# Patient Record
Sex: MALE | Race: ASIAN
[De-identification: names, ages, dates, MRNs, and addresses within clinical notes are randomized per-mention and may not be internally consistent; named-entity substitution may affect disease eponyms.]

---

## 2017-01-06 ENCOUNTER — HOSPITAL ENCOUNTER (OUTPATIENT)
Dept: HOSPITAL 10 - SDS | Age: 52
Discharge: HOME | End: 2017-01-06
Attending: SURGERY
Payer: COMMERCIAL

## 2017-01-06 VITALS — RESPIRATION RATE: 17 BRPM | DIASTOLIC BLOOD PRESSURE: 70 MMHG | SYSTOLIC BLOOD PRESSURE: 138 MMHG

## 2017-01-06 VITALS — HEART RATE: 52 BPM | SYSTOLIC BLOOD PRESSURE: 119 MMHG | DIASTOLIC BLOOD PRESSURE: 63 MMHG | RESPIRATION RATE: 14 BRPM

## 2017-01-06 VITALS
WEIGHT: 222.01 LBS | HEIGHT: 70 IN | BODY MASS INDEX: 31.78 KG/M2 | WEIGHT: 222.01 LBS | HEIGHT: 70 IN | BODY MASS INDEX: 31.78 KG/M2

## 2017-01-06 VITALS — DIASTOLIC BLOOD PRESSURE: 65 MMHG | SYSTOLIC BLOOD PRESSURE: 126 MMHG | RESPIRATION RATE: 14 BRPM | HEART RATE: 52 BPM

## 2017-01-06 VITALS — SYSTOLIC BLOOD PRESSURE: 132 MMHG | HEART RATE: 50 BPM | RESPIRATION RATE: 14 BRPM | DIASTOLIC BLOOD PRESSURE: 66 MMHG

## 2017-01-06 VITALS — HEART RATE: 52 BPM | RESPIRATION RATE: 14 BRPM | DIASTOLIC BLOOD PRESSURE: 60 MMHG | SYSTOLIC BLOOD PRESSURE: 121 MMHG

## 2017-01-06 VITALS — RESPIRATION RATE: 18 BRPM | HEART RATE: 50 BPM | DIASTOLIC BLOOD PRESSURE: 75 MMHG | SYSTOLIC BLOOD PRESSURE: 128 MMHG

## 2017-01-06 VITALS — DIASTOLIC BLOOD PRESSURE: 68 MMHG | RESPIRATION RATE: 10 BRPM | HEART RATE: 52 BPM | SYSTOLIC BLOOD PRESSURE: 131 MMHG

## 2017-01-06 VITALS — DIASTOLIC BLOOD PRESSURE: 60 MMHG | SYSTOLIC BLOOD PRESSURE: 120 MMHG | RESPIRATION RATE: 18 BRPM | HEART RATE: 51 BPM

## 2017-01-06 VITALS — DIASTOLIC BLOOD PRESSURE: 62 MMHG | SYSTOLIC BLOOD PRESSURE: 127 MMHG | RESPIRATION RATE: 14 BRPM

## 2017-01-06 VITALS — RESPIRATION RATE: 16 BRPM | DIASTOLIC BLOOD PRESSURE: 69 MMHG | SYSTOLIC BLOOD PRESSURE: 132 MMHG | HEART RATE: 52 BPM

## 2017-01-06 DIAGNOSIS — Z87.891: ICD-10-CM

## 2017-01-06 DIAGNOSIS — L91.0: Primary | ICD-10-CM

## 2017-01-06 LAB
ANION GAP SERPL CALC-SCNC: 16 MMOL/L (ref 8–16)
APTT BLD: 33.5 SEC (ref 25–35)
BASOPHILS # BLD AUTO: 0 10^3/UL (ref 0–0.1)
BASOPHILS NFR BLD: 0.5 % (ref 0–2)
BUN SERPL-MCNC: 18 MG/DL (ref 7–20)
CALCIUM SERPL-MCNC: 8.7 MG/DL (ref 8.4–10.2)
CHLORIDE SERPL-SCNC: 105 MMOL/L (ref 97–110)
CO2 SERPL-SCNC: 25 MMOL/L (ref 21–31)
CONDITION: 1
CREAT SERPL-MCNC: 0.87 MG/DL (ref 0.61–1.24)
EOSINOPHIL # BLD: 0.3 10^3/UL (ref 0–0.5)
EOSINOPHIL NFR BLD: 4.3 % (ref 0–7)
ERYTHROCYTE [DISTWIDTH] IN BLOOD BY AUTOMATED COUNT: 12.9 % (ref 11.5–14.5)
GLUCOSE SERPL-MCNC: 91 MG/DL (ref 70–220)
HCT VFR BLD CALC: 45.6 % (ref 42–52)
HGB BLD-MCNC: 15.6 G/DL (ref 14–18)
INR PPP: 0.91
LYMPHOCYTES # BLD AUTO: 2.1 10^3/UL (ref 0.8–2.9)
LYMPHOCYTES NFR BLD AUTO: 34 % (ref 15–51)
MCH RBC QN AUTO: 30.2 PG (ref 29–33)
MCHC RBC AUTO-ENTMCNC: 34.2 G/DL (ref 32–37)
MCV RBC AUTO: 88.4 FL (ref 82–101)
MONOCYTES # BLD: 0.5 10^3/UL (ref 0.3–0.9)
MONOCYTES NFR BLD: 8.7 % (ref 0–11)
NEUTROPHILS # BLD: 3.2 10^3/UL (ref 1.6–7.5)
NEUTROPHILS NFR BLD AUTO: 52.5 % (ref 39–77)
NRBC # BLD MANUAL: 0 10^3/UL (ref 0–0)
NRBC BLD QL: 0 /100WBC (ref 0–0)
PLATELET # BLD: 267 10^3/UL (ref 140–440)
PMV BLD AUTO: 8 FL (ref 7.4–10.4)
POTASSIUM SERPL-SCNC: 4.3 MMOL/L (ref 3.5–5.1)
PROTHROMBIN TIME: 12.2 SEC (ref 12.2–14.2)
PT RATIO: 1
RBC # BLD AUTO: 5.16 10^6/UL (ref 4.7–6.1)
SODIUM SERPL-SCNC: 142 MMOL/L (ref 135–144)
WBC # BLD AUTO: 6.2 10^3/UL (ref 4.8–10.8)
WBC # BLD: 6.2 10^3/UL (ref 4.8–10.8)

## 2017-01-06 PROCEDURE — 85730 THROMBOPLASTIN TIME PARTIAL: CPT

## 2017-01-06 PROCEDURE — 88304 TISSUE EXAM BY PATHOLOGIST: CPT

## 2017-01-06 PROCEDURE — 80048 BASIC METABOLIC PNL TOTAL CA: CPT

## 2017-01-06 PROCEDURE — 85610 PROTHROMBIN TIME: CPT

## 2017-01-06 PROCEDURE — 14020 TIS TRNFR S/A/L 10 SQ CM/<: CPT

## 2017-01-06 PROCEDURE — 85025 COMPLETE CBC W/AUTO DIFF WBC: CPT

## 2017-01-06 NOTE — OPR
DATE OF OPERATION:  01/06/2017

 

 

INDICATION:  This is a 51-year-old male with a left arm mass.  He requests surgical excision.  The r
isks, alternatives, benefits, and personnel were discussed with the patient. The patient expressed u
nderstanding and consented to the operation.

 

PREOPERATIVE DIAGNOSIS:  Left arm mass.

 

POSTOPERATIVE DIAGNOSIS:  Left arm mass.

 

OPERATION PERFORMED:

1.  Excision of left arm mass with a 5-cm size incision and a 5 x 1-cm size mass.

2.  Localized adjacent tissue transfer with the use of skin flaps.

 

SURGEON: Rylie Garcia MD

 

SPECIMEN:  Left arm mass.

 

COMPLICATIONS:  None.

 

ANESTHESIA:  MAC.

 

PROCEDURE:  The patient was taken to the OR and prepped and draped in the usual sterile fashion.  A 
surgical timeout was performed.  IV antibiotics were given.  An elliptical incision was made over th
e left arm mass after local anesthesia infiltration.  Dissection cautery was carried down to the lance
p tissue layers and excised.  Due to the large tissue defect, localized adjacent tissue transfer was
 used with the use of skin flaps.  Performed multilayer closure with interrupted 2-0 Vicryl and skin
 staples.  Dry dressings were applied.

 

 

Dictated By: RYLIE LIPSCOMB/JAX

DD:    01/06/2017 10:51:34

DT:    01/06/2017 12:05:52

Conf#: [id number]

DID#:  [id number]

## 2017-02-28 ENCOUNTER — HOSPITAL ENCOUNTER (EMERGENCY)
Dept: HOSPITAL 10 - E/R | Age: 52
LOS: 1 days | Discharge: HOME | End: 2017-03-01
Payer: COMMERCIAL

## 2017-02-28 VITALS
HEIGHT: 70 IN | BODY MASS INDEX: 31.62 KG/M2 | BODY MASS INDEX: 31.62 KG/M2 | WEIGHT: 220.9 LBS | WEIGHT: 220.9 LBS | HEIGHT: 70 IN

## 2017-02-28 DIAGNOSIS — R40.2252: ICD-10-CM

## 2017-02-28 DIAGNOSIS — R51: ICD-10-CM

## 2017-02-28 DIAGNOSIS — R40.2362: ICD-10-CM

## 2017-02-28 DIAGNOSIS — R40.2142: ICD-10-CM
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
## 2017-02-28 DIAGNOSIS — S09.90XA: Primary | ICD-10-CM

## 2017-02-28 PROCEDURE — 70450 CT HEAD/BRAIN W/O DYE: CPT

## 2017-02-28 NOTE — RADRPT
PROCEDURE:   CT head without Contrast

 

CLINICAL INDICATION:   Headache, status post fall 2 days ago

 

TECHNIQUE:   Transaxial images were made through the head on a multi-slice scanner without intraveno
us contrast.  Coronal and sagittal images were subsequently reformatted.

 

One or more of the following dose reduction techniques were used:

- Automated exposure control.

- Adjustment of the mA and/or kV according to patient size.

- Use of iterative reconstruction technique.

 

Radiation dose: CTDIvol = 39.34 mGy; DLP = 634.23 mGy-cm.

 

COMPARISON:   None

 

FINDINGS:

The calvarium appears intact.  There is an air-fluid level within the left maxillary sinus and the e
thmoid air cells are largely opacified bilaterally.  There is mild mucoperiosteal thickening at the 
inferior frontal sinuses.  The mastoid air cells are well-aerated.

The ventricles are normal in size and there is no midline shift.

No intracranial bleed, mass, or extra-axial fluid collection is identified.

There is good gray-white matter differentiation.

 

IMPRESSION: 

1.  No evidence of a skull fracture or acute intracranial injury.

2.  Air-fluid level seen in the left maxillary antrum with partial opacification of the ethmoid air 
cells bilaterally and with mucoperiosteal thickening involving the inferior frontal sinuses bilatera
lly suspicious for inflammatory change.

_____________________________________________ 

Physician Steve           Date    Time 

Electronically viewed and signed by Physician Steve on 02/28/2017 23:32 

 

D:  02/28/2017 23:32  T:  02/28/2017 23:32

/

## 2017-03-01 VITALS — SYSTOLIC BLOOD PRESSURE: 131 MMHG | HEART RATE: 88 BPM | DIASTOLIC BLOOD PRESSURE: 77 MMHG | RESPIRATION RATE: 16 BRPM
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
## 2017-04-19 ENCOUNTER — HOSPITAL ENCOUNTER (OUTPATIENT)
Dept: HOSPITAL 10 - SDS | Age: 52
Discharge: HOME | End: 2017-04-19
Attending: ORTHOPAEDIC SURGERY
Payer: COMMERCIAL

## 2017-04-19 VITALS — RESPIRATION RATE: 23 BRPM | DIASTOLIC BLOOD PRESSURE: 55 MMHG | SYSTOLIC BLOOD PRESSURE: 118 MMHG | HEART RATE: 60 BPM

## 2017-04-19 VITALS — RESPIRATION RATE: 16 BRPM | DIASTOLIC BLOOD PRESSURE: 71 MMHG | HEART RATE: 58 BPM | SYSTOLIC BLOOD PRESSURE: 144 MMHG

## 2017-04-19 VITALS — SYSTOLIC BLOOD PRESSURE: 118 MMHG | HEART RATE: 60 BPM | DIASTOLIC BLOOD PRESSURE: 45 MMHG | RESPIRATION RATE: 17 BRPM

## 2017-04-19 VITALS — DIASTOLIC BLOOD PRESSURE: 76 MMHG | RESPIRATION RATE: 18 BRPM | SYSTOLIC BLOOD PRESSURE: 140 MMHG | HEART RATE: 62 BPM

## 2017-04-19 VITALS — SYSTOLIC BLOOD PRESSURE: 131 MMHG | RESPIRATION RATE: 18 BRPM | DIASTOLIC BLOOD PRESSURE: 80 MMHG | HEART RATE: 60 BPM

## 2017-04-19 VITALS — HEART RATE: 60 BPM | SYSTOLIC BLOOD PRESSURE: 125 MMHG | DIASTOLIC BLOOD PRESSURE: 80 MMHG | RESPIRATION RATE: 17 BRPM

## 2017-04-19 VITALS — SYSTOLIC BLOOD PRESSURE: 137 MMHG | DIASTOLIC BLOOD PRESSURE: 78 MMHG | RESPIRATION RATE: 27 BRPM | HEART RATE: 58 BPM

## 2017-04-19 VITALS
WEIGHT: 219.14 LBS | HEIGHT: 70 IN | HEIGHT: 70 IN | BODY MASS INDEX: 31.37 KG/M2 | BODY MASS INDEX: 31.37 KG/M2 | WEIGHT: 219.14 LBS

## 2017-04-19 VITALS — RESPIRATION RATE: 14 BRPM | HEART RATE: 56 BPM | SYSTOLIC BLOOD PRESSURE: 165 MMHG | DIASTOLIC BLOOD PRESSURE: 86 MMHG

## 2017-04-19 VITALS — SYSTOLIC BLOOD PRESSURE: 127 MMHG | HEART RATE: 72 BPM | DIASTOLIC BLOOD PRESSURE: 69 MMHG | RESPIRATION RATE: 16 BRPM

## 2017-04-19 VITALS — SYSTOLIC BLOOD PRESSURE: 137 MMHG | RESPIRATION RATE: 18 BRPM | HEART RATE: 58 BPM | DIASTOLIC BLOOD PRESSURE: 76 MMHG

## 2017-04-19 DIAGNOSIS — Z89.022: ICD-10-CM

## 2017-04-19 DIAGNOSIS — L60.0: ICD-10-CM

## 2017-04-19 DIAGNOSIS — L72.0: ICD-10-CM

## 2017-04-19 DIAGNOSIS — T87.89: Primary | ICD-10-CM

## 2017-04-19 PROCEDURE — 88304 TISSUE EXAM BY PATHOLOGIST: CPT

## 2017-04-19 PROCEDURE — 26236 PARTIAL REMOVAL FINGER BONE: CPT

## 2017-04-19 NOTE — OPR
DATE OF OPERATION:  04/19/2017

 

 

ANESTHESIOLOGIST:  Francis Adams MD

 

SURGEON:  Evangelist Stapleton MD

 

ASSISTANT:  Staff.

 

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 
done elsewhere.

 

CURRENT PROBLEM:  Inclusion cyst tender corner distal ulnar dorsal corner of 
the amputation site with what was thought to be inclusion of some nail material.

 

OPERATION PERFORMED:  Revision of amputation, excision of nail fragment.

 

SURGICAL PAUSE:  I examined the patient in the preop holding area.  We examined 
his amputation stump.  We aga in the planned surgical incision.  I confirmed 
the operative procedure and plan with the patient awake as we discussed this 
procedure and how he wanted me to do it.  He wanted me to remove a little extra 
bone.    

 

INFORMED CONSENT:  At the time we scheduled the operative procedure back in the 
office, we talked about the risks and hazards of surgery mentioning OP mortality
, wound infection, nerve injury, good result, bad result, and potential 
complications.  The patient signed the note confirming that informed consent 
conversation.  

 

ANESTHESIA TECHNIQUE:  Local standby by the anesthesiologist, local anesthetic 
by the surgeon.

 

DESCRIPTION OF PROCEDURE:  The patient was awake throughout.  Sterile prep and 
drape of the left upper extremity was performed.  A Penrose drain was placed 
around the base of the ring finger.  A regional block was performed.  The prior 
surgical site was reincised.  We took an ellipse of skin around the edge of the 
inclusion cyst and the inclusion cyst.  We debrided the site and removed about 1
/8th of an inch of the distal end of the middle phalanx.  We washed the wound.  
We looked for all forms of foreign material and debrided it and then closed the 
wound loosely with interrupted Vicryl Rapide suture and a _two finger  
dressing.  The operative procedure was 15 minutes.  The patient was awake 
throughout.

 

DISCHARGE MEDICATIONS:

1.  Hydrocodone.

2.  Acetaminophen.

3.  Keflex.

 

FOLLOWUP:  Will be in our office in a week.

 

 

Dictated By: EVANGELIST KEBEDE/JAX

DD:    04/19/2017 16:58:33

DT:    04/19/2017 17:30:16

Conf#: [id number]

DID#:  [id number]

 

MTDD

## 2017-04-19 NOTE — OPR
Date/Time of Note


Date/Time of Note


DATE: 4/19/17 


TIME: 14:23





Operative Report


Procedure Date:  Apr 19, 2017


Preoperative Diagnosis


nail defomrity


Postoperative Diagnosis


same


Operation Performed


biopsy


Surgeon:  EVANGELIST BORJAS MD


Anesthesia:  general, other (lo)


Estimated Blood Loss:  none


Complications:  None


Pt Condition Post Procedure:  stable











EVANGELIST BORJAS MD Apr 19, 2017 14:24

## 2017-04-19 NOTE — HPN
Date/Time of Note


Date/Time of Note


DATE: 4/19/17 


TIME: 14:23





Interval H&P Admission Note


Pt. seen H&P reviewed:  No system changes











EVANGELIST BORJAS MD Apr 19, 2017 14:23

## 2017-11-14 ENCOUNTER — HOSPITAL ENCOUNTER (EMERGENCY)
Dept: HOSPITAL 10 - E/R | Age: 52
LOS: 1 days | Discharge: HOME | End: 2017-11-15
Payer: COMMERCIAL

## 2017-11-14 VITALS
WEIGHT: 233.69 LBS | BODY MASS INDEX: 33.46 KG/M2 | BODY MASS INDEX: 33.46 KG/M2 | WEIGHT: 233.69 LBS | HEIGHT: 70 IN | HEIGHT: 70 IN

## 2017-11-14 DIAGNOSIS — R06.02: ICD-10-CM

## 2017-11-14 DIAGNOSIS — R07.89: Primary | ICD-10-CM

## 2017-11-14 LAB
ALBUMIN SERPL-MCNC: 4.2 G/DL (ref 3.3–4.9)
ALBUMIN/GLOB SERPL: 1.44 {RATIO}
ALP SERPL-CCNC: 82 IU/L (ref 42–121)
ALT SERPL-CCNC: 53 IU/L (ref 13–69)
ANION GAP SERPL CALC-SCNC: 14 MMOL/L (ref 8–16)
AST SERPL-CCNC: 31 IU/L (ref 15–46)
BASOPHILS # BLD AUTO: 0 10^3/UL (ref 0–0.1)
BASOPHILS NFR BLD: 0.4 % (ref 0–2)
BILIRUB DIRECT SERPL-MCNC: 0 MG/DL (ref 0–0.2)
BILIRUB SERPL-MCNC: 0.4 MG/DL (ref 0.2–1.3)
BNP SERPL-MCNC: 44 PG/ML (ref 0–125)
BUN SERPL-MCNC: 17 MG/DL (ref 7–20)
CALCIUM SERPL-MCNC: 8.6 MG/DL (ref 8.4–10.2)
CHLORIDE SERPL-SCNC: 106 MMOL/L (ref 97–110)
CO2 SERPL-SCNC: 25 MMOL/L (ref 21–31)
CREAT SERPL-MCNC: 1.11 MG/DL (ref 0.61–1.24)
EOSINOPHIL # BLD: 0.3 10^3/UL (ref 0–0.5)
EOSINOPHIL NFR BLD: 3.8 % (ref 0–7)
ERYTHROCYTE [DISTWIDTH] IN BLOOD BY AUTOMATED COUNT: 12 % (ref 11.5–14.5)
GLOBULIN SER-MCNC: 2.9 G/DL (ref 1.3–3.2)
GLUCOSE SERPL-MCNC: 91 MG/DL (ref 70–220)
HCT VFR BLD CALC: 43.6 % (ref 42–52)
HGB BLD-MCNC: 15 G/DL (ref 14–18)
LYMPHOCYTES # BLD AUTO: 2.6 10^3/UL (ref 0.8–2.9)
LYMPHOCYTES NFR BLD AUTO: 38 % (ref 15–51)
MCH RBC QN AUTO: 30.2 PG (ref 29–33)
MCHC RBC AUTO-ENTMCNC: 34.4 G/DL (ref 32–37)
MCV RBC AUTO: 87.9 FL (ref 82–101)
MONOCYTES # BLD: 0.6 10^3/UL (ref 0.3–0.9)
MONOCYTES NFR BLD: 8.5 % (ref 0–11)
NEUTROPHILS # BLD: 3.4 10^3/UL (ref 1.6–7.5)
NEUTROPHILS NFR BLD AUTO: 49 % (ref 39–77)
NRBC # BLD MANUAL: 0 10^3/UL (ref 0–0)
NRBC BLD AUTO-RTO: 0 /100WBC (ref 0–0)
PLATELET # BLD: 283 10^3/UL (ref 140–415)
PMV BLD AUTO: 9.5 FL (ref 7.4–10.4)
POTASSIUM SERPL-SCNC: 4 MMOL/L (ref 3.5–5.1)
PROT SERPL-MCNC: 7.1 G/DL (ref 6.1–8.1)
RBC # BLD AUTO: 4.96 10^6/UL (ref 4.7–6.1)
SODIUM SERPL-SCNC: 141 MMOL/L (ref 135–144)
TROPONIN I SERPL-MCNC: < 0.012 NG/ML (ref 0–0.12)
WBC # BLD AUTO: 6.8 10^3/UL (ref 4.8–10.8)

## 2017-11-14 PROCEDURE — 80053 COMPREHEN METABOLIC PANEL: CPT

## 2017-11-14 PROCEDURE — 36415 COLL VENOUS BLD VENIPUNCTURE: CPT

## 2017-11-14 PROCEDURE — 83880 ASSAY OF NATRIURETIC PEPTIDE: CPT

## 2017-11-14 PROCEDURE — 84484 ASSAY OF TROPONIN QUANT: CPT

## 2017-11-14 PROCEDURE — 93005 ELECTROCARDIOGRAM TRACING: CPT

## 2017-11-14 PROCEDURE — 71010: CPT

## 2017-11-14 PROCEDURE — 85025 COMPLETE CBC W/AUTO DIFF WBC: CPT

## 2017-11-14 NOTE — RADRPT
PROCEDURE:   XR Chest. 

 

CLINICAL INDICATION:   Chest pain

 

TECHNIQUE:   Single AP portable chest.

 

COMPARISON:   No prior Chest x-ray

 

FINDINGS:

 

The cardiomediastinal silhouette is within normal limits of size. Atherosclerotic calcification of t
he aorta. The lungs are clear without  pleural effusion or focal consolidation. No pneumothorax. The
 osseous structures and soft tissues are unremarkable. 

 

IMPRESSION:

 

1. No evidence for active cardiopulmonary disease.

 

RPTAT:AAJJ

_____________________________________________ 

Physician Lizzeth           Date    Time 

Electronically viewed and signed by Physician Lizzeth on 11/14/2017 23:41 

 

D:  11/14/2017 23:41  T:  11/14/2017 23:41

SAHARA/

## 2017-11-15 VITALS
TEMPERATURE: 98.3 F | RESPIRATION RATE: 19 BRPM | HEART RATE: 81 BPM | SYSTOLIC BLOOD PRESSURE: 130 MMHG | DIASTOLIC BLOOD PRESSURE: 81 MMHG

## 2018-04-27 ENCOUNTER — HOSPITAL ENCOUNTER (OUTPATIENT)
Age: 53
Discharge: HOME | End: 2018-04-27

## 2018-04-27 ENCOUNTER — HOSPITAL ENCOUNTER (OUTPATIENT)
Dept: HOSPITAL 91 - GIL | Age: 53
Discharge: HOME | End: 2018-04-27
Payer: COMMERCIAL

## 2018-04-27 DIAGNOSIS — D12.2: ICD-10-CM

## 2018-04-27 DIAGNOSIS — K29.70: ICD-10-CM

## 2018-04-27 DIAGNOSIS — Z12.11: Primary | ICD-10-CM

## 2018-04-27 PROCEDURE — 43239 EGD BIOPSY SINGLE/MULTIPLE: CPT

## 2018-04-27 PROCEDURE — 88305 TISSUE EXAM BY PATHOLOGIST: CPT
